# Patient Record
Sex: MALE | Race: BLACK OR AFRICAN AMERICAN | Employment: UNEMPLOYED | URBAN - METROPOLITAN AREA
[De-identification: names, ages, dates, MRNs, and addresses within clinical notes are randomized per-mention and may not be internally consistent; named-entity substitution may affect disease eponyms.]

---

## 2021-10-19 ENCOUNTER — HOSPITAL ENCOUNTER (EMERGENCY)
Facility: HOSPITAL | Age: 31
Discharge: HOME | End: 2021-10-19
Attending: EMERGENCY MEDICINE
Payer: MEDICAID

## 2021-10-19 ENCOUNTER — APPOINTMENT (OUTPATIENT)
Dept: GENERAL RADIOLOGY | Facility: HOSPITAL | Age: 31
End: 2021-10-19
Attending: EMERGENCY MEDICINE
Payer: MEDICAID

## 2021-10-19 VITALS
HEART RATE: 88 BPM | RESPIRATION RATE: 18 BRPM | DIASTOLIC BLOOD PRESSURE: 80 MMHG | HEIGHT: 73 IN | WEIGHT: 190 LBS | SYSTOLIC BLOOD PRESSURE: 110 MMHG | TEMPERATURE: 98 F | BODY MASS INDEX: 25.18 KG/M2 | OXYGEN SATURATION: 97 %

## 2021-10-19 DIAGNOSIS — L08.9 WOUND INFECTION: Primary | ICD-10-CM

## 2021-10-19 DIAGNOSIS — J40 BRONCHITIS: ICD-10-CM

## 2021-10-19 DIAGNOSIS — T14.8XXA WOUND INFECTION: Primary | ICD-10-CM

## 2021-10-19 PROCEDURE — 96372 THER/PROPH/DIAG INJ SC/IM: CPT

## 2021-10-19 PROCEDURE — 87070 CULTURE OTHR SPECIMN AEROBIC: CPT | Performed by: EMERGENCY MEDICINE

## 2021-10-19 PROCEDURE — 87077 CULTURE AEROBIC IDENTIFY: CPT | Performed by: EMERGENCY MEDICINE

## 2021-10-19 PROCEDURE — 99284 EMERGENCY DEPT VISIT MOD MDM: CPT

## 2021-10-19 PROCEDURE — 87205 SMEAR GRAM STAIN: CPT | Performed by: EMERGENCY MEDICINE

## 2021-10-19 PROCEDURE — 71045 X-RAY EXAM CHEST 1 VIEW: CPT | Performed by: EMERGENCY MEDICINE

## 2021-10-19 PROCEDURE — 87186 SC STD MICRODIL/AGAR DIL: CPT | Performed by: EMERGENCY MEDICINE

## 2021-10-19 RX ORDER — HYDROCODONE BITARTRATE AND ACETAMINOPHEN 5; 325 MG/1; MG/1
1 TABLET ORAL EVERY 6 HOURS PRN
Qty: 10 TABLET | Refills: 0 | Status: SHIPPED | OUTPATIENT
Start: 2021-10-19

## 2021-10-19 RX ORDER — ALBUTEROL SULFATE 90 UG/1
2 AEROSOL, METERED RESPIRATORY (INHALATION) EVERY 4 HOURS PRN
Qty: 18 G | Refills: 0 | Status: SHIPPED | OUTPATIENT
Start: 2021-10-19 | End: 2021-11-18

## 2021-10-19 RX ORDER — KETOROLAC TROMETHAMINE 30 MG/ML
30 INJECTION, SOLUTION INTRAMUSCULAR; INTRAVENOUS ONCE
Status: DISCONTINUED | OUTPATIENT
Start: 2021-10-19 | End: 2021-10-19

## 2021-10-19 RX ORDER — DOXYCYCLINE HYCLATE 100 MG/1
100 CAPSULE ORAL 2 TIMES DAILY
Qty: 14 CAPSULE | Refills: 0 | Status: SHIPPED | OUTPATIENT
Start: 2021-10-19 | End: 2021-10-26

## 2021-10-19 RX ORDER — KETOROLAC TROMETHAMINE 30 MG/ML
30 INJECTION, SOLUTION INTRAMUSCULAR; INTRAVENOUS ONCE
Status: COMPLETED | OUTPATIENT
Start: 2021-10-19 | End: 2021-10-19

## 2021-10-19 RX ORDER — BENZONATATE 100 MG/1
100 CAPSULE ORAL 3 TIMES DAILY PRN
Qty: 15 CAPSULE | Refills: 0 | Status: SHIPPED | OUTPATIENT
Start: 2021-10-19 | End: 2021-10-24
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
## 2023-08-23 ENCOUNTER — HOSPITAL ENCOUNTER (EMERGENCY)
Facility: HOSPITAL | Age: 33
Discharge: STILL ADMITTED OR ADMINISTRATIVE | End: 2023-08-24
Payer: MEDICAID

## 2023-08-23 ENCOUNTER — HOSPITAL ENCOUNTER (EMERGENCY)
Facility: HOSPITAL | Age: 33
Discharge: HOME | End: 2023-08-23
Attending: STUDENT IN AN ORGANIZED HEALTH CARE EDUCATION/TRAINING PROGRAM
Payer: MEDICAID

## 2023-08-23 VITALS
HEART RATE: 78 BPM | HEIGHT: 73 IN | RESPIRATION RATE: 19 BRPM | TEMPERATURE: 98 F | WEIGHT: 175 LBS | SYSTOLIC BLOOD PRESSURE: 114 MMHG | DIASTOLIC BLOOD PRESSURE: 72 MMHG | OXYGEN SATURATION: 99 % | BODY MASS INDEX: 23.19 KG/M2

## 2023-08-23 DIAGNOSIS — R53.1 WEAKNESS GENERALIZED: Primary | ICD-10-CM

## 2023-08-23 LAB
ALBUMIN SERPL-MCNC: 4 G/DL (ref 3.4–5)
ALP LIVER SERPL-CCNC: 55 U/L
ALT SERPL-CCNC: 26 U/L
ANION GAP SERPL CALC-SCNC: 5 MMOL/L (ref 0–18)
AST SERPL-CCNC: 28 U/L (ref 15–37)
BASOPHILS # BLD AUTO: 0.04 X10(3) UL (ref 0–0.2)
BASOPHILS NFR BLD AUTO: 0.8 %
BILIRUB DIRECT SERPL-MCNC: 0.1 MG/DL (ref 0–0.2)
BILIRUB SERPL-MCNC: 0.7 MG/DL (ref 0.1–2)
BUN BLD-MCNC: 13 MG/DL (ref 7–18)
BUN/CREAT SERPL: 10.7 (ref 10–20)
CALCIUM BLD-MCNC: 8.8 MG/DL (ref 8.5–10.1)
CHLORIDE SERPL-SCNC: 106 MMOL/L (ref 98–112)
CK SERPL-CCNC: 285 U/L
CO2 SERPL-SCNC: 29 MMOL/L (ref 21–32)
CREAT BLD-MCNC: 1.22 MG/DL
DEPRECATED RDW RBC AUTO: 40.4 FL (ref 35.1–46.3)
EGFRCR SERPLBLD CKD-EPI 2021: 80 ML/MIN/1.73M2 (ref 60–?)
EOSINOPHIL # BLD AUTO: 0.35 X10(3) UL (ref 0–0.7)
EOSINOPHIL NFR BLD AUTO: 7.1 %
ERYTHROCYTE [DISTWIDTH] IN BLOOD BY AUTOMATED COUNT: 12.8 % (ref 11–15)
GLUCOSE BLD-MCNC: 102 MG/DL (ref 70–99)
GLUCOSE BLDC GLUCOMTR-MCNC: 115 MG/DL (ref 70–99)
HCT VFR BLD AUTO: 43.3 %
HGB BLD-MCNC: 14.6 G/DL
IMM GRANULOCYTES # BLD AUTO: 0.01 X10(3) UL (ref 0–1)
IMM GRANULOCYTES NFR BLD: 0.2 %
LYMPHOCYTES # BLD AUTO: 2.11 X10(3) UL (ref 1–4)
LYMPHOCYTES NFR BLD AUTO: 42.9 %
MCH RBC QN AUTO: 29.3 PG (ref 26–34)
MCHC RBC AUTO-ENTMCNC: 33.7 G/DL (ref 31–37)
MCV RBC AUTO: 86.9 FL
MONOCYTES # BLD AUTO: 0.51 X10(3) UL (ref 0.1–1)
MONOCYTES NFR BLD AUTO: 10.4 %
NEUTROPHILS # BLD AUTO: 1.9 X10 (3) UL (ref 1.5–7.7)
NEUTROPHILS # BLD AUTO: 1.9 X10(3) UL (ref 1.5–7.7)
NEUTROPHILS NFR BLD AUTO: 38.6 %
OSMOLALITY SERPL CALC.SUM OF ELEC: 290 MOSM/KG (ref 275–295)
PLATELET # BLD AUTO: 278 10(3)UL (ref 150–450)
POTASSIUM SERPL-SCNC: 4.2 MMOL/L (ref 3.5–5.1)
PROT SERPL-MCNC: 7.5 G/DL (ref 6.4–8.2)
RBC # BLD AUTO: 4.98 X10(6)UL
SARS-COV-2 RNA RESP QL NAA+PROBE: NOT DETECTED
SODIUM SERPL-SCNC: 140 MMOL/L (ref 136–145)
WBC # BLD AUTO: 4.9 X10(3) UL (ref 4–11)

## 2023-08-23 PROCEDURE — 93010 ELECTROCARDIOGRAM REPORT: CPT

## 2023-08-23 PROCEDURE — 93005 ELECTROCARDIOGRAM TRACING: CPT

## 2023-08-23 PROCEDURE — 80048 BASIC METABOLIC PNL TOTAL CA: CPT | Performed by: STUDENT IN AN ORGANIZED HEALTH CARE EDUCATION/TRAINING PROGRAM

## 2023-08-23 PROCEDURE — 96360 HYDRATION IV INFUSION INIT: CPT

## 2023-08-23 PROCEDURE — 87491 CHLMYD TRACH DNA AMP PROBE: CPT | Performed by: STUDENT IN AN ORGANIZED HEALTH CARE EDUCATION/TRAINING PROGRAM

## 2023-08-23 PROCEDURE — 87591 N.GONORRHOEAE DNA AMP PROB: CPT | Performed by: STUDENT IN AN ORGANIZED HEALTH CARE EDUCATION/TRAINING PROGRAM

## 2023-08-23 PROCEDURE — 85025 COMPLETE CBC W/AUTO DIFF WBC: CPT | Performed by: STUDENT IN AN ORGANIZED HEALTH CARE EDUCATION/TRAINING PROGRAM

## 2023-08-23 PROCEDURE — 96361 HYDRATE IV INFUSION ADD-ON: CPT

## 2023-08-23 PROCEDURE — 99284 EMERGENCY DEPT VISIT MOD MDM: CPT

## 2023-08-23 PROCEDURE — 82962 GLUCOSE BLOOD TEST: CPT

## 2023-08-23 PROCEDURE — 80076 HEPATIC FUNCTION PANEL: CPT | Performed by: STUDENT IN AN ORGANIZED HEALTH CARE EDUCATION/TRAINING PROGRAM

## 2023-08-23 PROCEDURE — 82550 ASSAY OF CK (CPK): CPT | Performed by: STUDENT IN AN ORGANIZED HEALTH CARE EDUCATION/TRAINING PROGRAM

## 2023-08-23 NOTE — ED INITIAL ASSESSMENT (HPI)
Patient arrives via EMS from work with reports of near syncope, dizziness. Patient reports headache x 3 days. Denies medical history.

## 2023-08-24 LAB
ATRIAL RATE: 62 BPM
C TRACH DNA SPEC QL NAA+PROBE: NEGATIVE
N GONORRHOEA DNA SPEC QL NAA+PROBE: NEGATIVE
P AXIS: -2 DEGREES
P-R INTERVAL: 132 MS
Q-T INTERVAL: 372 MS
QRS DURATION: 84 MS
QTC CALCULATION (BEZET): 377 MS
R AXIS: 24 DEGREES
T AXIS: 26 DEGREES
VENTRICULAR RATE: 62 BPM

## 2023-08-29 ENCOUNTER — HOSPITAL ENCOUNTER (EMERGENCY)
Facility: HOSPITAL | Age: 33
Discharge: HOME | End: 2023-08-29
Attending: EMERGENCY MEDICINE
Payer: MEDICAID

## 2023-08-29 ENCOUNTER — APPOINTMENT (OUTPATIENT)
Dept: GENERAL RADIOLOGY | Facility: HOSPITAL | Age: 33
End: 2023-08-29
Attending: EMERGENCY MEDICINE
Payer: MEDICAID

## 2023-08-29 VITALS
TEMPERATURE: 98 F | RESPIRATION RATE: 17 BRPM | DIASTOLIC BLOOD PRESSURE: 69 MMHG | OXYGEN SATURATION: 94 % | HEART RATE: 60 BPM | WEIGHT: 170 LBS | BODY MASS INDEX: 21.82 KG/M2 | SYSTOLIC BLOOD PRESSURE: 133 MMHG | HEIGHT: 74 IN

## 2023-08-29 DIAGNOSIS — T40.2X1A OPIOID OVERDOSE, ACCIDENTAL OR UNINTENTIONAL, INITIAL ENCOUNTER (HCC): Primary | ICD-10-CM

## 2023-08-29 LAB — GLUCOSE BLDC GLUCOMTR-MCNC: 302 MG/DL (ref 70–99)

## 2023-08-29 PROCEDURE — 99285 EMERGENCY DEPT VISIT HI MDM: CPT

## 2023-08-29 PROCEDURE — 96360 HYDRATION IV INFUSION INIT: CPT

## 2023-08-29 PROCEDURE — 82962 GLUCOSE BLOOD TEST: CPT

## 2023-08-29 PROCEDURE — 71045 X-RAY EXAM CHEST 1 VIEW: CPT | Performed by: EMERGENCY MEDICINE

## 2023-08-29 PROCEDURE — 93005 ELECTROCARDIOGRAM TRACING: CPT

## 2023-08-30 LAB
ATRIAL RATE: 86 BPM
P AXIS: 61 DEGREES
P-R INTERVAL: 154 MS
Q-T INTERVAL: 358 MS
QRS DURATION: 94 MS
QTC CALCULATION (BEZET): 428 MS
R AXIS: 24 DEGREES
T AXIS: 56 DEGREES
VENTRICULAR RATE: 86 BPM

## 2023-08-30 NOTE — ED INITIAL ASSESSMENT (HPI)
Per EMS, pt found unresponsive at home by family. EMS reports pt had pulse & respirations but snoring. Pinpoint pupils. Per EMS 3 mg of narcan given. , NS started. Per EMS, pt reported to take 3-4 fentanyl pills, unsure how consumed or what time. Pt with hx of fentanyl use.  Denies SI

## 2023-08-30 NOTE — PROGRESS NOTES
Assessment- 2023  Lavanda Baumgarten  1990  [id number]    Patient is currently emergency room at [facility] was referred to the  for suspected Substance Use Disorder by the Mental Health Liaison. The  met with the Patient for the purpose of introduction (identifier- Name &amp; ), assessment, and to provide information on Alcohol Use Disorder services. The patient reports being a 26-year-old anda American male. The patient reports his primary choice being fentanyl it was his first time he states. Patient reported taking two pills of fentanyl and overdosing. The patient denies any suicidal / homicidal ideation. The patient does meet ASAM criteria and DSM V Criteria for Substance Use Disorder. The patient decline all services offered by this writer. The  will follow up with the Physician regarding outcome of the assessment patient declining all services, but did accept business card along with resources. The patient will  meet with case management upon discharge for more resources. Ann Marie, [address]  [address], [facility] [address]  [phone]/ work cell

## 2023-08-30 NOTE — ED QUICK NOTES
Pt discharged to home. Provided with shirt and socks. Calling friends to try and get a ride home. Instructed on the availability of over the counter narcan if he continues to use. All questions answered prior to disposition. Pt ambulatory out of the ER with no signs of distress.

## 2023-08-30 NOTE — DISCHARGE INSTRUCTIONS
The  will follow up with the Physician regarding outcome of the assessment patient declining all services, but did accept business card along with resources. The patient will  meet with case management upon discharge for more resources. Denis Franco, [address]  [address], [address] [place] [address]  [phone]/ work cell    [employer]  [phone]  Janet Andres   [phone]  [address]